# Patient Record
Sex: FEMALE | Race: WHITE | Employment: OTHER | URBAN - METROPOLITAN AREA
[De-identification: names, ages, dates, MRNs, and addresses within clinical notes are randomized per-mention and may not be internally consistent; named-entity substitution may affect disease eponyms.]

---

## 2018-11-12 ENCOUNTER — TELEPHONE (OUTPATIENT)
Dept: OTHER | Age: 63
End: 2018-11-12

## 2019-09-09 ENCOUNTER — HOSPITAL ENCOUNTER (OUTPATIENT)
Dept: GENERAL RADIOLOGY | Age: 64
Discharge: HOME | End: 2019-09-09
Payer: COMMERCIAL

## 2019-09-09 DIAGNOSIS — D50.9 IRON DEFICIENCY ANEMIA, UNSPECIFIED IRON DEFICIENCY ANEMIA TYPE: ICD-10-CM

## 2019-09-09 PROCEDURE — 74250 X-RAY XM SM INT 1CNTRST STD: CPT

## 2023-08-03 ENCOUNTER — HOSPITAL ENCOUNTER (OUTPATIENT)
Dept: PHYSICAL THERAPY | Age: 68
Setting detail: THERAPIES SERIES
Discharge: HOME | End: 2023-08-03
Payer: MEDICARE

## 2023-08-03 PROCEDURE — 97110 THERAPEUTIC EXERCISES: CPT

## 2023-08-03 PROCEDURE — 97162 PT EVAL MOD COMPLEX 30 MIN: CPT

## 2023-08-03 NOTE — PROGRESS NOTES
Floor to stand   HEP    Aire[employer]      [facility]one step overs      Intervals      Wall sits      Monster walks w/ ankle wt                  Other Therapeutic Activities:  see eval     Home Exercise Program:      Manual Treatments:      Modalities:      Timed Code Treatment Minutes:  15     Total Treatment Minutes:  45 minutes     Treatment/Activity Tolerance:  [] Patient tolerated treatment well [x] Patient limited by fatigue  [] Patient limited by pain  [x] Patient limited by other medical complications  [] Other:     Prognosis: [] Good [x] Fair  [] Poor    Patient Requires Follow-up: [] Yes  [] No    Plan:   [] Continue per plan of care [] Alter current plan (see comments)  [x] Plan of care initiated [] Hold pending MD visit [] Discharge    Plan for Next Session:  see above     Electronically signed by:  Bryon Lesches, PT,PT

## 2023-08-08 ENCOUNTER — HOSPITAL ENCOUNTER (OUTPATIENT)
Dept: PHYSICAL THERAPY | Age: 68
Setting detail: THERAPIES SERIES
Discharge: HOME | End: 2023-08-08
Payer: MEDICARE

## 2023-08-08 PROCEDURE — 97110 THERAPEUTIC EXERCISES: CPT

## 2023-08-08 NOTE — PROGRESS NOTES
Physical Therapy Daily Treatment Note  Date:  2023    Patient Name:  Geovanna Jacques    :  1955  MRN: [id number]  Restrictions/Precautions:   active cancer   Medical Diagnosis Information:   Lumbar radiculopathy [M54.16]  Cervical myelopathy ([address]) [G95.9]    Treatment Diagnosis:  global deconditioning     Insurance/Certification information:   Medicare part A/B primary, [facility] secondary   Physician Information:   CRISTIANA Joseph   Plan of care signed (Y/N):  sent   Visit# / total visits:    Pain level: 0/10     Medicare cap:   $202    Progress Note: []  Yes  [x]  No  Next due by: Visit #10        Subjective (from eval):  pt reports having significant difficulty with home exercise program and requested going over exercises this date. Pt requests working on stairs next visit d/t difficulty at home. She reports feeling like she's arching her back a lot in the exercises. Pt presents with c/o deconditioning and shortness of breath with activity. Pt reports she does not have pain in neck or back but has had a significantly difficult time taking care of granddaughter and performing ADLs d/t LE weakness and deconditioning. Pt used to swim for hours, lift and do yoga and lately has had significant decline since last lymphoma treatment. Pt has had significant trouble with exercise (recently unable to carry cat upstairs and felt like legs were going to give out), has been very careful with carrying granddaughter. Leg weakness has has made patient think about using a cane, has significantly decreased walking and has had difficulty shortness of breath and fatigue. Pt can walk ~1/3 of a mile prior to having to stop and catch her breath and sit down. Pt reports her blood pressure runs low (yesterday was 88/62) and reports \"wooziness\" while walking today in clinic BP: 117/75. Pt reports dizziness and feeling like she will fall in the shower consistently.  Occasionally has dizziness other times

## 2023-08-10 ENCOUNTER — HOSPITAL ENCOUNTER (OUTPATIENT)
Dept: PHYSICAL THERAPY | Age: 68
Setting detail: THERAPIES SERIES
Discharge: HOME | End: 2023-08-10
Payer: MEDICARE

## 2023-08-10 NOTE — PROGRESS NOTES
Physical Therapy  Cancellation/No-show Note  Patient Name:  Garrett Branch  :  1955   Date:  8/10/2023  Cancels to date: 1  No-shows to date: 0    For today's appointment patient:  [x] Cancelled  [] Rescheduled appointment  [] No-show     Reason given by patient:  [x] Patient ill  [] Conflicting appointment  [] No transportation    [] Conflict with work  [] No reason given  [] Other:     Comments:      Electronically signed by:  Pat Moyer PT

## 2023-08-14 ENCOUNTER — HOSPITAL ENCOUNTER (OUTPATIENT)
Dept: PHYSICAL THERAPY | Age: 68
Setting detail: THERAPIES SERIES
Discharge: HOME | End: 2023-08-14
Payer: MEDICARE

## 2023-08-14 PROCEDURE — 97530 THERAPEUTIC ACTIVITIES: CPT

## 2023-08-14 PROCEDURE — 97110 THERAPEUTIC EXERCISES: CPT

## 2023-08-14 NOTE — PROGRESS NOTES
Physical Therapy Daily Treatment Note  Date:  2023    Patient Name:  Kathrin Carrasquillo    :  1955  MRN: [id number]  Restrictions/Precautions:   active cancer   Medical Diagnosis Information:   Lumbar radiculopathy [M54.16]  Cervical myelopathy ([address]) [G95.9]    Treatment Diagnosis:  global deconditioning     Insurance/Certification information:   Medicare part A/B primary, [facility] secondary   Physician Information:   CRISTIANA Meza   Plan of care signed (Y/N):  sent   Visit# / total visits:  3/12  Pain level: 0/10     Medicare cap:   $306    Progress Note: []  Yes  [x]  No  Next due by: Visit #10        Subjective (from eval):  pt reports being pretty sick end of last week. Wondering if her blood is low again. She hasn't been able to complete a lot of the HEP 2/2 shortness of breath. She also has difficulty carrying things and going up and down stairs. Patient would like to work on her handwriting and hand strength as well. Pt presents with c/o deconditioning and shortness of breath with activity. Pt reports she does not have pain in neck or back but has had a significantly difficult time taking care of granddaughter and performing ADLs d/t LE weakness and deconditioning. Pt used to swim for hours, lift and do yoga and lately has had significant decline since last lymphoma treatment. Pt has had significant trouble with exercise (recently unable to carry cat upstairs and felt like legs were going to give out), has been very careful with carrying granddaughter. Leg weakness has has made patient think about using a cane, has significantly decreased walking and has had difficulty shortness of breath and fatigue. Pt can walk ~1/3 of a mile prior to having to stop and catch her breath and sit down. Pt reports her blood pressure runs low (yesterday was 88/62) and reports \"wooziness\" while walking today in clinic BP: 117/75.  Pt reports dizziness and feeling like she will fall in the shower

## 2023-08-16 ENCOUNTER — HOSPITAL ENCOUNTER (OUTPATIENT)
Dept: PHYSICAL THERAPY | Age: 68
Setting detail: THERAPIES SERIES
Discharge: HOME | End: 2023-08-16
Payer: MEDICARE

## 2023-08-16 PROCEDURE — 97112 NEUROMUSCULAR REEDUCATION: CPT

## 2023-08-16 PROCEDURE — 97110 THERAPEUTIC EXERCISES: CPT

## 2023-08-16 PROCEDURE — 97116 GAIT TRAINING THERAPY: CPT

## 2023-08-16 NOTE — PROGRESS NOTES
Physical Therapy Daily Treatment Note  Date:  2023    Patient Name:  Jose F Sagastume    :  1955  MRN: [id number]  Restrictions/Precautions:   active cancer   Medical Diagnosis Information:   Lumbar radiculopathy [M54.16]  Cervical myelopathy ([address]) [G95.9]    Treatment Diagnosis:  global deconditioning     Insurance/Certification information:   Medicare part A/B primary, [facility] secondary   Physician Information:   CRISTIANA Priest   Plan of care signed (Y/N):  sent   Visit# / total visits:  3/12  Pain level: 0/10     Medicare cap:   $399    Progress Note: []  Yes  [x]  No  Next due by: Visit #10      Subjective:   Pt reports she is having a good day today. She got a call from the research assistant and she will have PET scan, CT scan and ECHO prior to starting clinical trial. Pt reports beginning of September she goes in for clinical trial for short admission and then has follow ups throughout September. Hoping to take 1-2 week break from PT for admission and re eval or resume PT services. BP: 95/73, 104/59 after stair training      From Eval:   Pt presents with c/o deconditioning and shortness of breath with activity. Pt reports she does not have pain in neck or back but has had a significantly difficult time taking care of granddaughter and performing ADLs d/t LE weakness and deconditioning. Pt used to swim for hours, lift and do yoga and lately has had significant decline since last lymphoma treatment. Pt has had significant trouble with exercise (recently unable to carry cat upstairs and felt like legs were going to give out), has been very careful with carrying granddaughter. Leg weakness has has made patient think about using a cane, has significantly decreased walking and has had difficulty shortness of breath and fatigue. Pt can walk ~1/3 of a mile prior to having to stop and catch her breath and sit down.  Pt reports her blood pressure runs low (yesterday was 88/62) and reports

## 2023-08-22 ENCOUNTER — APPOINTMENT (OUTPATIENT)
Dept: PHYSICAL THERAPY | Age: 68
End: 2023-08-22
Payer: MEDICARE

## 2023-08-24 ENCOUNTER — HOSPITAL ENCOUNTER (OUTPATIENT)
Dept: PHYSICAL THERAPY | Age: 68
Setting detail: THERAPIES SERIES
Discharge: HOME | End: 2023-08-24
Payer: MEDICARE

## 2023-08-24 NOTE — PROGRESS NOTES
Physical Therapy  Cancellation/No-show Note  Patient Name:  Lionel Mcmillan  :  1955   Date:  2023  Cancels to date: 2  No-shows to date: 0    For today's appointment patient:  [x] Cancelled  [] Rescheduled appointment  [] No-show     Reason given by patient:  [] Patient ill  [] Conflicting appointment  [] No transportation    [] Conflict with work  [] No reason given  [x] Other:  pt starting clinical trial at this time with planned admission. Pt will call and reschedule re evaluation when able.     Comments:      Electronically signed by:  Bryon Lesches, PT

## 2023-08-29 ENCOUNTER — APPOINTMENT (OUTPATIENT)
Dept: PHYSICAL THERAPY | Age: 68
End: 2023-08-29
Payer: MEDICARE

## 2023-08-31 ENCOUNTER — APPOINTMENT (OUTPATIENT)
Dept: PHYSICAL THERAPY | Age: 68
End: 2023-08-31
Payer: MEDICARE

## 2023-10-05 ENCOUNTER — HOSPITAL ENCOUNTER (OUTPATIENT)
Dept: OCCUPATIONAL THERAPY | Age: 68
Setting detail: THERAPIES SERIES
Discharge: HOME | End: 2023-10-05
Payer: MEDICARE

## 2023-10-05 PROCEDURE — 97535 SELF CARE MNGMENT TRAINING: CPT

## 2023-10-05 PROCEDURE — 97530 THERAPEUTIC ACTIVITIES: CPT

## 2023-10-05 PROCEDURE — 97110 THERAPEUTIC EXERCISES: CPT

## 2023-10-05 PROCEDURE — 97165 OT EVAL LOW COMPLEX 30 MIN: CPT

## 2023-10-05 NOTE — FLOWSHEET NOTE
Patient        Short Term Goals:  to be met in 2 weeks (by 10/19/2023)     Pt will independently verbalize 2 pieces of adaptive equipment that may reduce hand pain/fatigue. [] Progressing: [] Met: [] Not Met: [] Adjusted     Pt will report consistent compliance with HEP at least 4x/wk  [] Progressing: [] Met: [] Not Met: [] Adjusted     Pt will demo improved [place] for daily living tasks by completing NHPT with left in 22 seconds or less. [] Progressing: [] Met: [] Not Met: [] Adjusted     Pt will demo improved [place] for daily living tasks by completing NHPT with right in 20 seconds or less. [] Progressing: [] Met: [] Not Met: [] Adjusted     Long Term Goals:  to be met in 4 weeks (by 11/2/2023)     Pt will increase right  strength to 60lbs to improve performance with daily living tasks that require power grasp. [] Progressing: [] Met: [] Not Met: [] Adjusted     Pt will Increase right and left lateral pinch to 15 lbs to improve performance with fine motor tasks such as turning keys and carrying plates. [] Progressing: [] Met: [] Not Met: [] Adjusted     Pt will increase left  strength to 58lbs to improve performance with daily living tasks that require power grasp. [] Progressing: [] Met: [] Not Met: [] Adjusted     Pt will demo improved [place] strength/endurance by rating \"carry baby in carrier\" as moderate difficulty   [] Progressing: [] Met: [] Not Met: [] Adjusted     Pt will rate satisfaction with printed name as 7/10. [] Progressing: [] Met: [] Not Met: [] Adjusted     Pt will rate satisfaction with signature as 9/10.    [] Progressing: [] Met: [] Not Met: [] Adjusted        Prognosis: []Good   []Fair   []Poor    Patient Requires Follow-up:  [x]Yes  []No    Plan: [x]Plan of care initiated     [x]Continue per plan of care    [] Alter current plan (see comments)    []Hold pending MD visit []Discharge      ---

## 2023-10-05 NOTE — THERAPY EVALUATION
[facility], [address] [place] [address]  Phone: [phone]  Fax [phone]     Occupational Therapy Certification    Dear Jaqui Sorto MD      We had the pleasure of evaluating the following patient for occupational therapy services at [facility]. A summary of our findings can be found in the initial assessment below. This includes our plan of care. If you have any questions or concerns regarding these findings, please do not hesitate to contact me at the office phone number above. Thank you for the referral.       Provider Signature:_______________________________Date:__________________  By signing above (or electronic signature), therapist's plan is approved by physician      Patient: Vaishali Cochran   : 1955   MRN: [id number]       Evaluation Date: 10/5/2023        Medical Diagnosis/ICD 10:  Cervical Myelopathy (G95.9)    Referral Date:  10/4/2023    Referring Physician:        Jaqui Sorto MD                                                               Insurance information: Medicare. BMN. Does not require Auth. Precautions/ Contraindications:   Red Flag Symptoms: currently undergoing cancer treatment    Adhesive allergy: NO  Latex Allergy:  [x]NO      []YES     Preferred Language for Healthcare:   [x]English       []other:    C-SSRS Triggered by Intake questionnaire (Past 2 wk assessment):   [] No, Questionnaire did not trigger screening.    [x] Yes, Patient intake triggered further evaluation      [x] C-SSRS Screening completed - no further action required at this time  [] PCP notified via Plan of Care  [] Emergency services notified    Plan of care sent to provider:      [x]Faxed 10/5/23  []Co-signature    (attempts: 1[x] 2 []3[])         Relevant Medical History:   Per PT eval on 8/3/2023:  \"NORBERTO (B) 2012 and throat cancer, active

## 2023-10-10 ENCOUNTER — APPOINTMENT (OUTPATIENT)
Dept: OCCUPATIONAL THERAPY | Age: 68
End: 2023-10-10
Payer: MEDICARE

## 2023-10-12 ENCOUNTER — HOSPITAL ENCOUNTER (OUTPATIENT)
Dept: OCCUPATIONAL THERAPY | Age: 68
Setting detail: THERAPIES SERIES
End: 2023-10-12
Payer: MEDICARE

## 2023-10-17 ENCOUNTER — APPOINTMENT (OUTPATIENT)
Dept: OCCUPATIONAL THERAPY | Age: 68
End: 2023-10-17
Payer: MEDICARE

## 2023-10-19 ENCOUNTER — APPOINTMENT (OUTPATIENT)
Dept: OCCUPATIONAL THERAPY | Age: 68
End: 2023-10-19
Payer: MEDICARE

## 2023-10-24 ENCOUNTER — APPOINTMENT (OUTPATIENT)
Dept: OCCUPATIONAL THERAPY | Age: 68
End: 2023-10-24
Payer: MEDICARE

## 2023-10-26 ENCOUNTER — APPOINTMENT (OUTPATIENT)
Dept: OCCUPATIONAL THERAPY | Age: 68
End: 2023-10-26
Payer: MEDICARE

## 2023-12-27 NOTE — THERAPY EVALUATION
OBJECTIVE FINDINGS    Imaging Results: per patient report recent MRI of neck and back seem consistent with stenosis in cervical and lumbar       Palpation/Tenderness/Visual Inspection       No postural asymmetries noted, no tenderness to palpation to paraspinals, SI, piriformis, or neck        Gait/Steps/Balance    []   [facility] [x]    Dysfunction Noted. Comment: pt with narrow jj and 1 minor loss of balance during 6MWT, mild Trendelenberg to L with fatigue      6MWT: 230m, stopped d/t dizziness at 4 minutes     Balance   Single limb stance: 2-3s each side, requires UE support. Squat: notable dynamic valgus and difficulty maintaining   Floor to stand: requires UE support and pushing through legs     Cervical Range of Motion: all wfl, reports mild stretch but no nerve pain        Lumbar Range of Motion/Strength Testing; no nerve pain    [x] All WFL except as marked below  ROM (*denotes pain) AROM PROM COMMENTS   Flexion      Extension      Sidebending Left      Sidebending Right      Rotation Left    []   Seated  []   Standing       Rotation Right    []   Seated  []   Standing           Other Special Tests Lumbar Spine and Hip  Special Test Findings   Standing:    Lumbar reposition []Neg   []Pos   Seated:    Slump Test/Dural Tension [x]Neg   []Pos R   []Pos L   []NT          [x] All dermatomes WNL except as marked below   [x] All  myotomes WNL except as marked below      Dermatome Left Right   Anterior groin, 2-3 inches below ASIS (L1-L2)     Middle third anterior thigh (L3)     Patella and med malleolus (L4)     Fibular head and dorsum of foot (L5)     Lateral side and plantar surface of foot (S1)     Medial aspect of posterior thigh (S2)     Perianal area (S3,4)            Range of Motion/Strength Testing     [x] All ROM and strength WNL except as marked below   No pain, significant difficulty with L hip weakness: anti gravity strength but immediately fails and has to take a break, unable to hold.      Range
36.5

## 2024-06-07 ENCOUNTER — HOSPITAL ENCOUNTER (OUTPATIENT)
Dept: PHYSICAL THERAPY | Age: 69
Setting detail: THERAPIES SERIES
Discharge: HOME | End: 2024-06-07
Payer: MEDICARE

## 2024-06-07 DIAGNOSIS — R29.898 WEAKNESS OF BOTH SHOULDERS: ICD-10-CM

## 2024-06-07 DIAGNOSIS — M25.512 CHRONIC PAIN OF BOTH SHOULDERS: ICD-10-CM

## 2024-06-07 DIAGNOSIS — G89.29 CHRONIC PAIN OF BOTH SHOULDERS: ICD-10-CM

## 2024-06-07 DIAGNOSIS — G89.29 NECK PAIN, CHRONIC: Primary | ICD-10-CM

## 2024-06-07 DIAGNOSIS — M25.511 CHRONIC PAIN OF BOTH SHOULDERS: ICD-10-CM

## 2024-06-07 DIAGNOSIS — M54.2 NECK PAIN, CHRONIC: Primary | ICD-10-CM

## 2024-06-07 PROCEDURE — 97162 PT EVAL MOD COMPLEX 30 MIN: CPT

## 2024-06-07 PROCEDURE — 97110 THERAPEUTIC EXERCISES: CPT

## 2024-06-07 PROCEDURE — 97140 MANUAL THERAPY 1/> REGIONS: CPT

## 2024-06-07 NOTE — PLAN OF CARE
evaluation    Electronically Signed by Alicia Wing, PT  Date: 06/07/2024     Note: Portions of this note have been templated and/or copied from initial evaluation, reassessments and prior notes for documentation efficiency.    Note: If patient does not return for scheduled/recommended follow up visits, this note will serve as a discharge from care along with the most recent update on progress.    Complex Ortho Evaluation

## 2024-06-10 ENCOUNTER — APPOINTMENT (OUTPATIENT)
Dept: PHYSICAL THERAPY | Age: 69
End: 2024-06-10
Payer: MEDICARE

## 2024-06-14 ENCOUNTER — APPOINTMENT (OUTPATIENT)
Dept: PHYSICAL THERAPY | Age: 69
End: 2024-06-14
Payer: MEDICARE

## 2024-06-17 ENCOUNTER — APPOINTMENT (OUTPATIENT)
Dept: PHYSICAL THERAPY | Age: 69
End: 2024-06-17
Payer: MEDICARE

## 2024-06-21 ENCOUNTER — HOSPITAL ENCOUNTER (OUTPATIENT)
Dept: PHYSICAL THERAPY | Age: 69
Setting detail: THERAPIES SERIES
Discharge: HOME | End: 2024-06-21
Payer: MEDICARE

## 2024-06-21 PROCEDURE — 97110 THERAPEUTIC EXERCISES: CPT

## 2024-06-21 PROCEDURE — 97140 MANUAL THERAPY 1/> REGIONS: CPT

## 2024-06-21 PROCEDURE — 97530 THERAPEUTIC ACTIVITIES: CPT

## 2024-06-21 NOTE — PLAN OF CARE
swelling/inflammation/restriction, improving soft tissue extensibility and allowing for proper ROM for normal function with self care, mobility, lifting and ambulation      GOALS     Patient stated goal: \"Regain strength and normalcy, return to swimming\"   [] Progressing: [] Met: [] Not Met: [] Adjusted    Therapist goals for Patient:   Short Term Goals: To be achieved in: 2 weeks  1. Independent in HEP and progression per patient tolerance, in order to prevent re-injury.   [] Progressing: [] Met: [] Not Met: [] Adjusted  2. Patient will have a decrease in pain to <0/10 to facilitate improvement in movement, function, and ADLs as indicated by Functional Deficits.  [] Progressing: [] Met: [] Not Met: [] Adjusted    Long Term Goals: To be achieved in: 8 weeks  1. Disability index score of 10% or less for the Neck Disability Index to assist with reaching prior level of function with activities such as meal prep, sleeping, reading, housekeeping.  [] Progressing: [] Met: [] Not Met: [] Adjusted  2. Patient will demonstrate increased AROM of WNL cervical flexion, extension, side bending, and rotation without pain to allow for proper joint functioning to enable patient to return to driving without issues.   [] Progressing: [] Met: [] Not Met: [] Adjusted  3. Patient will demonstrate increased Strength of 4+/5 to shoulder abduction and flexion to allow for proper functional mobility to enable patient to return to swimming   [] Progressing: [] Met: [] Not Met: [] Adjusted  4. Patient will return to swimming without increased symptoms or restriction.   [] Progressing: [] Met: [] Not Met: [] Adjusted      Overall Progression Towards Functional goals/ Treatment Progress Update:  [] Patient is progressing as expected towards functional goals listed.    [] Progression is slowed due to complexities/Impairments listed.  [] Progression has been slowed due to co-morbidities.  [x] Plan just implemented, too soon (<30days) to assess

## 2024-06-25 ENCOUNTER — HOSPITAL ENCOUNTER (OUTPATIENT)
Dept: PHYSICAL THERAPY | Age: 69
Setting detail: THERAPIES SERIES
Discharge: HOME | End: 2024-06-25
Payer: MEDICARE

## 2024-06-25 PROCEDURE — 97110 THERAPEUTIC EXERCISES: CPT

## 2024-06-25 PROCEDURE — 97140 MANUAL THERAPY 1/> REGIONS: CPT

## 2024-06-25 NOTE — PLAN OF CARE
hoping to go out of town on Friday but is only allowed to go if blood work is stable Thursday.         Test used Initial score  6/7/24    Pain Summary VAS 5/10 (L side of neck and shoulder today)    Functional questionnaire Neck Disability Index 23/50           OBJECTIVE EXAMINATION     See eval     Exercises/Interventions     Observation: pt presents without neck brace (9 weeks post op)     Therapeutic Ex (23147)  Resistance Sets/time Reps Notes/Cues/Progressions    2 10 (5s holds)  HEP    2 10  HEP    2  10 (5s holds)  HEP   [place] band   HEP   [place] band    HEP   Supine scap punches at 90  2# DB   20 (B)     Supine circles   2# DB  20 (B)     Chest flys supine  2# DB 2 10     Shoulder ER with towel  Red band  2 10      3'  (Changing every minute)     Foam roll protocol (pillow under neck to maintain neck in neutral)   3' hold  20 hugs, reaches, punches     P[place]ley abduction and flexion   1' 2     TPR using tennis ball to mid trap and rhomboids (standing)   1' (B)      Ball on wall (endurance training)  2# ball 1' (B) both directions  Cues for form and making sure not to compensate with upper trap.                  NMR re-education (39717)                            Therapeutic Activity (79167)        ~7'       ~10'             Manual Intervention (61574) Time: 10'    STM to cervical musculature with emphasis on TPR to upper trap (B) insertions, suboccipital release and gentle PROM to all directions. Educated on tennis ball to use for traveling           Modalities:    No modalities applied this session    Education/Home Exercise Program: Patient HEP program created electronically.  Refer to exercise grid       ASSESSMENT     Today's Assessment: continued emphasis on shoulder endurance and strengthening while keeping neck in neutral. Educated on safe positioning for traveling including sleeping, brace wear, and taking tennis ball for mid back/trap release.     Medical Necessity Documentation:  I certify that this

## 2024-06-27 ENCOUNTER — APPOINTMENT (OUTPATIENT)
Dept: PHYSICAL THERAPY | Age: 69
End: 2024-06-27
Payer: MEDICARE